# Patient Record
Sex: FEMALE | Race: BLACK OR AFRICAN AMERICAN | NOT HISPANIC OR LATINO | Employment: STUDENT | URBAN - METROPOLITAN AREA
[De-identification: names, ages, dates, MRNs, and addresses within clinical notes are randomized per-mention and may not be internally consistent; named-entity substitution may affect disease eponyms.]

---
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
## 2025-08-05 ENCOUNTER — OFFICE VISIT (OUTPATIENT)
Dept: URGENT CARE | Facility: CLINIC | Age: 20
End: 2025-08-05
Payer: COMMERCIAL

## 2025-08-05 VITALS
DIASTOLIC BLOOD PRESSURE: 58 MMHG | TEMPERATURE: 98 F | SYSTOLIC BLOOD PRESSURE: 131 MMHG | OXYGEN SATURATION: 100 % | RESPIRATION RATE: 16 BRPM | BODY MASS INDEX: 26.51 KG/M2 | WEIGHT: 189.38 LBS | HEART RATE: 78 BPM | HEIGHT: 71 IN

## 2025-08-05 DIAGNOSIS — S80.812A ABRASION OF LEFT LOWER LEG, INITIAL ENCOUNTER: Primary | ICD-10-CM

## 2025-08-05 PROBLEM — D16.5: Status: ACTIVE | Noted: 2025-02-24

## 2025-08-05 PROBLEM — D16.5 BENIGN NEOPLASM OF LOWER JAW BONE: Status: ACTIVE | Noted: 2025-06-06

## 2025-08-05 PROCEDURE — 99203 OFFICE O/P NEW LOW 30 MIN: CPT | Mod: S$GLB,,, | Performed by: PHYSICIAN ASSISTANT

## 2025-08-05 RX ORDER — AMOXICILLIN AND CLAVULANATE POTASSIUM 875; 125 MG/1; MG/1
1 TABLET, FILM COATED ORAL 2 TIMES DAILY
Qty: 10 TABLET | Refills: 0 | Status: SHIPPED | OUTPATIENT
Start: 2025-08-05 | End: 2025-08-10

## 2025-08-05 RX ORDER — MUPIROCIN 20 MG/G
OINTMENT TOPICAL 2 TIMES DAILY
Qty: 15 G | Refills: 0 | Status: SHIPPED | OUTPATIENT
Start: 2025-08-05

## 2025-08-05 NOTE — PROGRESS NOTES
"Subjective:      Patient ID: Janeen Freeman is a 20 y.o. female.    Vitals:  height is 5' 11" (1.803 m) and weight is 85.9 kg (189 lb 6 oz). Her tympanic temperature is 98.1 °F (36.7 °C). Her blood pressure is 131/58 (abnormal) and her pulse is 78. Her respiration is 16 and oxygen saturation is 100%.     Chief Complaint: Leg Wound    Patient presents with wound to left lower leg after dropping box and corner hit her leg. 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 Incident occurred about 30 min-1 hour ago. Reports pain is minimal but she would like wound cleaned. Tetanus UTD.     Wound Check  There has been no drainage from the wound. There is no swelling present. The pain has not changed. She has no difficulty moving the affected extremity or digit.       Constitution: Negative.   Musculoskeletal:  Negative for joint pain, joint swelling and abnormal ROM of joint.   Skin:  Positive for wound. Negative for bruising.   Neurological:  Negative for numbness.      Objective:     Physical Exam   Constitutional: She appears well-developed.  Non-toxic appearance. She does not appear ill. No distress.   HENT:   Head: Normocephalic and atraumatic.   Ears:   Right Ear: External ear normal.   Left Ear: External ear normal.   Nose: Nose normal.   Eyes: Conjunctivae and EOM are normal.   Neck: Neck supple.   Pulmonary/Chest: Effort normal.   Abdominal: Normal appearance.   Musculoskeletal: Normal range of motion.         General: Normal range of motion.   Neurological: no focal deficit. She is alert. She displays no weakness. No sensory deficit. Gait normal.   Skin: Skin is warm, dry, not diaphoretic, not pale and no rash. Capillary refill takes less than 2 seconds.        Psychiatric: Her behavior is normal.       Assessment:     1. Abrasion of left lower leg, initial encounter        Plan:       Abrasion of left lower leg, initial encounter  -     mupirocin " (BACTROBAN) 2 % ointment; Apply topically 2 (two) times daily.  Dispense: 15 g; Refill: 0  -     amoxicillin-clavulanate 875-125mg (AUGMENTIN) 875-125 mg per tablet; Take 1 tablet by mouth 2 (two) times daily. for 5 days  Dispense: 10 tablet; Refill: 0          Medical Decision Making:   Urgent Care Management:  Wound cleaned with Hibiclens. Bactroban, non stick gauze and coban pressure dressing applied. Discussed home wound care. Apply bactroban bid. Prophylactic abx given site of injury w/recent surgical procedure. Tylenol or ibuprofen prn for pain. Monitor for signs of infection. Close f/u with PCP or go to ED if concern for infection.

## 2025-08-05 NOTE — LETTER
August 5, 2025      [facility] [address] [place] [address]  Phone: [phone]  Fax: [phone]       Patient: Janeen Freeman   YOB: 2005  Date of Visit: 08/05/2025    To Whom It May Concern:    Xavier Freeman  was at [facility] on 08/05/2025. The patient may return to work/school on 08/06/25 with no restrictions. If you have any questions or concerns, or if I can be of further assistance, please do not hesitate to contact me.    Sincerely,    Citlali Gayle PA-C

## 2025-08-06 NOTE — PATIENT INSTRUCTIONS
- Apply Bactroban ointment to affected area with q-tip twice daily.   - Recommend using antibacterial soap. Keep area clean and dry.   - Follow up with PCP or return to clinic if symptoms worsen (redness spreads, fever 100.4 or greater, swelling or pain worsens)

## 2025-08-07 ENCOUNTER — TELEPHONE (OUTPATIENT)
Dept: URGENT CARE | Facility: CLINIC | Age: 20
End: 2025-08-07
Payer: COMMERCIAL